# Patient Record
Sex: FEMALE | Race: BLACK OR AFRICAN AMERICAN | NOT HISPANIC OR LATINO | Employment: FULL TIME | URBAN - METROPOLITAN AREA
[De-identification: names, ages, dates, MRNs, and addresses within clinical notes are randomized per-mention and may not be internally consistent; named-entity substitution may affect disease eponyms.]

---

## 2023-03-27 LAB
ALANINE AMINOTRANSFERASE (SGPT) (U/L) IN SER/PLAS: 23 U/L (ref 7–45)
ALBUMIN (G/DL) IN SER/PLAS: 4 G/DL (ref 3.4–5)
ALKALINE PHOSPHATASE (U/L) IN SER/PLAS: 72 U/L (ref 33–136)
ANION GAP IN SER/PLAS: 12 MMOL/L (ref 10–20)
ASPARTATE AMINOTRANSFERASE (SGOT) (U/L) IN SER/PLAS: 19 U/L (ref 9–39)
BILIRUBIN TOTAL (MG/DL) IN SER/PLAS: 0.5 MG/DL (ref 0–1.2)
CALCIDIOL (25 OH VITAMIN D3) (NG/ML) IN SER/PLAS: 32 NG/ML
CALCIUM (MG/DL) IN SER/PLAS: 10.5 MG/DL (ref 8.6–10.6)
CALCIUM (MG/DL) IN URINE: 9.8 MG/DL
CALCIUM (MG/L) IN 24 HOUR URINE: 162 MG/24H (ref 100–300)
CARBON DIOXIDE, TOTAL (MMOL/L) IN SER/PLAS: 26 MMOL/L (ref 21–32)
CHLORIDE (MMOL/L) IN SER/PLAS: 106 MMOL/L (ref 98–107)
COLLECTION DURATION OF URINE: 24 HR
COLLECTION DURATION OF URINE: 24 HR
CREATININE (MG/24HR) IN 24 HOUR URINE: 0.9 G/24H (ref 0.67–1.59)
CREATININE (MG/24HR) IN 24 HOUR URINE: 0.9 G/24H (ref 0.67–1.59)
CREATININE (MG/DL) IN SER/PLAS: 0.67 MG/DL (ref 0.5–1.05)
CREATININE (MG/DL) IN URINE: 54.1 MG/DL (ref 20–320)
CREATININE (MG/DL) IN URINE: 54.1 MG/DL (ref 20–320)
GFR FEMALE: >90 ML/MIN/1.73M2
GLUCOSE (MG/DL) IN SER/PLAS: 103 MG/DL (ref 74–99)
PARATHYRIN INTACT (PG/ML) IN SER/PLAS: 102.9 PG/ML (ref 18.5–88)
PHOSPHATE (MG/DL) IN URINE: 21 MG/DL
PHOSPHORUS 24 HOUR URINE: 348 MG/24H (ref 400–1300)
POTASSIUM (MMOL/L) IN SER/PLAS: 4.1 MMOL/L (ref 3.5–5.3)
PROTEIN TOTAL: 7 G/DL (ref 6.4–8.2)
SODIUM (MMOL/L) IN SER/PLAS: 140 MMOL/L (ref 136–145)
THYROPEROXIDASE AB (IU/ML) IN SER/PLAS: 320 IU/ML
THYROTROPIN (MIU/L) IN SER/PLAS BY DETECTION LIMIT <= 0.05 MIU/L: 1.86 MIU/L (ref 0.44–3.98)
THYROXINE (T4) FREE (NG/DL) IN SER/PLAS: 1.13 NG/DL (ref 0.78–1.48)
UREA NITROGEN (MG/DL) IN SER/PLAS: 10 MG/DL (ref 6–23)
VOLUME OF URINE: 1657 ML
VOLUME OF URINE: 1657 ML

## 2023-03-30 LAB
THYROGLOBULIN AB (IU/ML) IN SER/PLAS: <0.9 IU/ML (ref 0–4)
THYROGLOBULIN LC-MS/MS: ABNORMAL NG/ML (ref 1.3–31.8)
THYROGLOBULIN: 172.4 NG/ML (ref 1.3–31.8)

## 2023-04-24 DIAGNOSIS — E78.5 HYPERLIPIDEMIA, UNSPECIFIED HYPERLIPIDEMIA TYPE: Primary | ICD-10-CM

## 2023-04-24 DIAGNOSIS — E55.9 VITAMIN D INSUFFICIENCY: ICD-10-CM

## 2023-04-24 RX ORDER — VIT C/E/ZN/COPPR/LUTEIN/ZEAXAN 250MG-90MG
25 CAPSULE ORAL DAILY
Qty: 30 CAPSULE | Refills: 11 | Status: SHIPPED | OUTPATIENT
Start: 2023-04-24 | End: 2023-04-27 | Stop reason: SDUPTHER

## 2023-04-24 RX ORDER — ATORVASTATIN CALCIUM 10 MG/1
10 TABLET, FILM COATED ORAL DAILY
Qty: 30 TABLET | Refills: 11 | Status: SHIPPED | OUTPATIENT
Start: 2023-04-24 | End: 2023-05-03 | Stop reason: SDUPTHER

## 2023-04-27 RX ORDER — VIT C/E/ZN/COPPR/LUTEIN/ZEAXAN 250MG-90MG
25 CAPSULE ORAL DAILY
Qty: 30 CAPSULE | Refills: 11 | Status: SHIPPED | OUTPATIENT
Start: 2023-04-27 | End: 2023-05-03 | Stop reason: SDUPTHER

## 2023-05-03 ENCOUNTER — LAB (OUTPATIENT)
Dept: LAB | Facility: LAB | Age: 63
End: 2023-05-03
Payer: COMMERCIAL

## 2023-05-03 ENCOUNTER — OFFICE VISIT (OUTPATIENT)
Dept: PRIMARY CARE | Facility: CLINIC | Age: 63
End: 2023-05-03
Payer: COMMERCIAL

## 2023-05-03 VITALS
TEMPERATURE: 97.8 F | HEIGHT: 68 IN | BODY MASS INDEX: 33.8 KG/M2 | SYSTOLIC BLOOD PRESSURE: 127 MMHG | DIASTOLIC BLOOD PRESSURE: 83 MMHG | WEIGHT: 223 LBS | OXYGEN SATURATION: 100 % | HEART RATE: 66 BPM

## 2023-05-03 DIAGNOSIS — E21.3 HYPERPARATHYROIDISM (MULTI): ICD-10-CM

## 2023-05-03 DIAGNOSIS — I10 PRIMARY HYPERTENSION: ICD-10-CM

## 2023-05-03 DIAGNOSIS — E78.5 HYPERLIPIDEMIA, UNSPECIFIED HYPERLIPIDEMIA TYPE: ICD-10-CM

## 2023-05-03 DIAGNOSIS — Z00.00 ROUTINE GENERAL MEDICAL EXAMINATION AT A HEALTH CARE FACILITY: Primary | ICD-10-CM

## 2023-05-03 DIAGNOSIS — Z12.31 OTHER SCREENING MAMMOGRAM: ICD-10-CM

## 2023-05-03 LAB
CHOLESTEROL (MG/DL) IN SER/PLAS: 184 MG/DL (ref 0–199)
CHOLESTEROL IN HDL (MG/DL) IN SER/PLAS: 79.3 MG/DL
CHOLESTEROL/HDL RATIO: 2.3
LDL: 92 MG/DL (ref 0–99)
TRIGLYCERIDE (MG/DL) IN SER/PLAS: 64 MG/DL (ref 0–149)
VLDL: 13 MG/DL (ref 0–40)

## 2023-05-03 PROCEDURE — 99213 OFFICE O/P EST LOW 20 MIN: CPT | Performed by: FAMILY MEDICINE

## 2023-05-03 PROCEDURE — 36415 COLL VENOUS BLD VENIPUNCTURE: CPT

## 2023-05-03 PROCEDURE — 3079F DIAST BP 80-89 MM HG: CPT | Performed by: FAMILY MEDICINE

## 2023-05-03 PROCEDURE — 99396 PREV VISIT EST AGE 40-64: CPT | Performed by: FAMILY MEDICINE

## 2023-05-03 PROCEDURE — 1036F TOBACCO NON-USER: CPT | Performed by: FAMILY MEDICINE

## 2023-05-03 PROCEDURE — 3074F SYST BP LT 130 MM HG: CPT | Performed by: FAMILY MEDICINE

## 2023-05-03 PROCEDURE — 80061 LIPID PANEL: CPT

## 2023-05-03 RX ORDER — ATORVASTATIN CALCIUM 10 MG/1
1 TABLET, FILM COATED ORAL NIGHTLY
COMMUNITY
Start: 2022-05-16 | End: 2023-05-03 | Stop reason: SDUPTHER

## 2023-05-03 RX ORDER — GLUCOSAMINE HCL 500 MG
1 TABLET ORAL DAILY
COMMUNITY
Start: 2023-04-26

## 2023-05-03 RX ORDER — HYDROCHLOROTHIAZIDE 12.5 MG/1
1 TABLET ORAL DAILY
COMMUNITY
Start: 2013-06-29 | End: 2023-05-03 | Stop reason: SDUPTHER

## 2023-05-03 RX ORDER — LATANOPROST 50 UG/ML
SOLUTION/ DROPS OPHTHALMIC
COMMUNITY
Start: 2021-04-08

## 2023-05-03 RX ORDER — HYDROCHLOROTHIAZIDE 12.5 MG/1
12.5 TABLET ORAL DAILY
Qty: 90 TABLET | Refills: 2 | Status: SHIPPED | OUTPATIENT
Start: 2023-05-03 | End: 2023-10-02 | Stop reason: SDUPTHER

## 2023-05-03 RX ORDER — ATORVASTATIN CALCIUM 10 MG/1
10 TABLET, FILM COATED ORAL DAILY
Qty: 90 TABLET | Refills: 2 | Status: SHIPPED | OUTPATIENT
Start: 2023-05-03 | End: 2024-05-01 | Stop reason: SDUPTHER

## 2023-05-03 RX ORDER — KETOTIFEN FUMARATE 0.35 MG/ML
SOLUTION/ DROPS OPHTHALMIC EVERY 12 HOURS
COMMUNITY
Start: 2021-12-14

## 2023-05-03 ASSESSMENT — PAIN SCALES - GENERAL: PAINLEVEL: 0-NO PAIN

## 2023-05-03 NOTE — PROGRESS NOTES
"Subjective   Patient ID: Louann Vazquez is a 63 y.o. who presents for Follow-up.  HPI    Louann Vazquez is a 63 y.o. year old here for an annual physical.    Concerns:     No concerns today. We reviewed the following:  -Hypertension- compliant with medication. No headaches, chest pain, shortness of breath, vision changes, dizziness  - Hyperparathyroidism- follows with endocrinology, stable  - Meningioma- follows with neurosurgery, stable    Diet: Reviewed diet. Working on decreasing junk food  Exercise: Walking frequently  Tobacco: Declines  Alcohol: Declines  Drugs: Declines  Depression: PHQ2-0    The 10-year ASCVD risk score (Afshin MALONEY, et al., 2019) is: 13.3%    Values used to calculate the score:      Age: 63 years      Sex: Female      Is Non- : Yes      Diabetic: Yes      Tobacco smoker: No      Systolic Blood Pressure: 127 mmHg      Is BP treated: No      HDL Cholesterol: 68.7 mg/dL      Total Cholesterol: 170 mg/dL    Immunizations due: Up to date    Breast Cancer  - last mammogram: May 2022    Cervical Cancer  - last pap: 2021    Colon Cancer  - last colonoscopy: 2021    Lipid: Tolerating atorvastatin. Due for lipid panel    Review of Systems  10 point review of systems negative except as noted in HPI.    Objective     /83 (BP Location: Left arm, Patient Position: Sitting)   Pulse 66   Temp 36.6 °C (97.8 °F)   Ht 1.727 m (5' 8\")   Wt 101 kg (223 lb)   SpO2 100%   BMI 33.91 kg/m²   General: well appearing, no distress  Neck: No lymphadenopathy, no thyromegaly  CV: Regular rate and rhythm, no murmur  Lungs: Clear to auscultation bilaterally  Abdomen: Soft, nontender, nondistended  Extremities: No edema noted  Psych: Appropriate mood and affect     Assessment/Plan   62 yo F here for follow-up and HM exam    HM  - Mammogram ordered  - check lipids  - UP to date on other screenings    HTN  - Continue hydrochlorothiazide    Hyperparathyroidism  - Continue management per " endocrine    Meningioma  - Continue management per neurosurgery     RTC 6 months or sooner as needed

## 2023-08-07 ENCOUNTER — APPOINTMENT (OUTPATIENT)
Dept: PRIMARY CARE | Facility: CLINIC | Age: 63
End: 2023-08-07
Payer: COMMERCIAL

## 2023-09-22 PROBLEM — H90.42 SENSORINEURAL HEARING LOSS (SNHL) OF LEFT EAR WITH UNRESTRICTED HEARING OF RIGHT EAR: Status: ACTIVE | Noted: 2023-09-22

## 2023-09-22 PROBLEM — E04.1 THYROID NODULE: Status: ACTIVE | Noted: 2023-09-22

## 2023-09-22 PROBLEM — E66.9 OBESITY: Status: ACTIVE | Noted: 2023-09-22

## 2023-09-22 PROBLEM — E83.52 HYPERCALCEMIA: Status: ACTIVE | Noted: 2023-09-22

## 2023-09-22 PROBLEM — R87.612 LOW GRADE SQUAMOUS INTRAEPITHELIAL LESION (LGSIL) ON PAPANICOLAOU SMEAR OF CERVIX: Status: ACTIVE | Noted: 2023-09-22

## 2023-09-22 PROBLEM — H61.21 IMPACTED CERUMEN OF RIGHT EAR: Status: ACTIVE | Noted: 2023-09-22

## 2023-09-22 PROBLEM — H90.3 ASYMMETRICAL SENSORINEURAL HEARING LOSS: Status: ACTIVE | Noted: 2023-09-22

## 2023-09-22 PROBLEM — K21.9 GERD (GASTROESOPHAGEAL REFLUX DISEASE): Status: ACTIVE | Noted: 2023-09-22

## 2023-09-22 PROBLEM — H10.9 CONJUNCTIVITIS: Status: ACTIVE | Noted: 2023-09-22

## 2023-09-22 PROBLEM — M70.61 GREATER TROCHANTERIC BURSITIS OF RIGHT HIP: Status: ACTIVE | Noted: 2023-09-22

## 2023-09-22 PROBLEM — I42.9 CARDIOMYOPATHY (MULTI): Status: ACTIVE | Noted: 2023-09-22

## 2023-09-22 PROBLEM — H69.90 EUSTACHIAN TUBE DYSFUNCTION: Status: ACTIVE | Noted: 2023-09-22

## 2023-09-22 PROBLEM — U07.1 COVID-19 VIRUS INFECTION: Status: ACTIVE | Noted: 2023-09-22

## 2023-09-22 PROBLEM — N30.01 ACUTE CYSTITIS WITH HEMATURIA: Status: ACTIVE | Noted: 2023-09-22

## 2023-09-22 PROBLEM — D72.819 LEUKOPENIA: Status: ACTIVE | Noted: 2023-09-22

## 2023-09-22 PROBLEM — M16.11 PRIMARY OSTEOARTHRITIS OF RIGHT HIP: Status: ACTIVE | Noted: 2023-09-22

## 2023-09-22 PROBLEM — D32.9 MENINGIOMA (MULTI): Status: ACTIVE | Noted: 2023-09-22

## 2023-09-22 PROBLEM — E11.9 DIABETES MELLITUS (MULTI): Status: ACTIVE | Noted: 2023-09-22

## 2023-09-22 PROBLEM — E55.9 VITAMIN D DEFICIENCY: Status: ACTIVE | Noted: 2023-09-22

## 2023-10-02 ENCOUNTER — OFFICE VISIT (OUTPATIENT)
Dept: PRIMARY CARE | Facility: CLINIC | Age: 63
End: 2023-10-02
Payer: COMMERCIAL

## 2023-10-02 VITALS
WEIGHT: 222.8 LBS | BODY MASS INDEX: 33.88 KG/M2 | HEART RATE: 70 BPM | SYSTOLIC BLOOD PRESSURE: 121 MMHG | TEMPERATURE: 97.5 F | DIASTOLIC BLOOD PRESSURE: 76 MMHG

## 2023-10-02 DIAGNOSIS — E78.5 HYPERLIPIDEMIA, UNSPECIFIED HYPERLIPIDEMIA TYPE: Primary | ICD-10-CM

## 2023-10-02 DIAGNOSIS — I10 PRIMARY HYPERTENSION: ICD-10-CM

## 2023-10-02 DIAGNOSIS — R05.3 CHRONIC COUGH: ICD-10-CM

## 2023-10-02 DIAGNOSIS — J30.9 ALLERGIC RHINITIS, UNSPECIFIED SEASONALITY, UNSPECIFIED TRIGGER: ICD-10-CM

## 2023-10-02 PROCEDURE — 3074F SYST BP LT 130 MM HG: CPT | Performed by: FAMILY MEDICINE

## 2023-10-02 PROCEDURE — 3078F DIAST BP <80 MM HG: CPT | Performed by: FAMILY MEDICINE

## 2023-10-02 PROCEDURE — 1036F TOBACCO NON-USER: CPT | Performed by: FAMILY MEDICINE

## 2023-10-02 PROCEDURE — 99214 OFFICE O/P EST MOD 30 MIN: CPT | Performed by: FAMILY MEDICINE

## 2023-10-02 RX ORDER — VIT C/E/ZN/COPPR/LUTEIN/ZEAXAN 250MG-90MG
25 CAPSULE ORAL DAILY
COMMUNITY
Start: 2023-08-18

## 2023-10-02 RX ORDER — HYDROCHLOROTHIAZIDE 12.5 MG/1
12.5 TABLET ORAL DAILY
Qty: 90 TABLET | Refills: 2 | Status: SHIPPED | OUTPATIENT
Start: 2023-10-02

## 2023-10-02 RX ORDER — MINERAL OIL
180 ENEMA (ML) RECTAL DAILY
Qty: 30 TABLET | Refills: 5 | Status: SHIPPED | OUTPATIENT
Start: 2023-10-02 | End: 2024-10-01

## 2023-10-02 RX ORDER — BENZONATATE 100 MG/1
100 CAPSULE ORAL 3 TIMES DAILY PRN
Qty: 42 CAPSULE | Refills: 0 | Status: SHIPPED | OUTPATIENT
Start: 2023-10-02 | End: 2023-11-01

## 2023-10-02 ASSESSMENT — ENCOUNTER SYMPTOMS
JOINT SWELLING: 0
DIFFICULTY URINATING: 0
APPETITE CHANGE: 0
CONSTIPATION: 0
FATIGUE: 0
NAUSEA: 0
WHEEZING: 0
RHINORRHEA: 1
UNEXPECTED WEIGHT CHANGE: 0
EYE ITCHING: 0
EYE REDNESS: 0
SLEEP DISTURBANCE: 0
COLOR CHANGE: 0
DIZZINESS: 0
DIARRHEA: 0
BACK PAIN: 0
SINUS PRESSURE: 1
DYSURIA: 0
FEVER: 0
ABDOMINAL PAIN: 0
COUGH: 1
CHEST TIGHTNESS: 0
SHORTNESS OF BREATH: 0
MYALGIAS: 0
EYE PAIN: 0
AGITATION: 0
FACIAL ASYMMETRY: 0
SINUS PAIN: 1
TROUBLE SWALLOWING: 0
VOMITING: 0
DIAPHORESIS: 0
ARTHRALGIAS: 1
NERVOUS/ANXIOUS: 0
SORE THROAT: 1
HEMATURIA: 0

## 2023-10-02 ASSESSMENT — PAIN SCALES - GENERAL: PAINLEVEL: 0-NO PAIN

## 2023-10-02 NOTE — PROGRESS NOTES
Subjective   Patient ID: Louann Vazquez is a 63 y.o. female who presents for Follow-up.    HPI     # Cough  - Last 2 and a half weeks  - Dry cough, coughs hard  - Occurs in fits for 5-10 minutes, then goes away -- 8-9 times day  - Sinus pressure, nose running, sneezing -- all summer -- has happened before in the summer  - Eyes itch and water - was rtaking Zyrtec and Nyquil and started not to help  - Throat sore from coughing  - Does not feel like has a fever  - Tested COVID negative  - Last week chinese cough syrup and didn't help    # HTN  - IO /76  - Taking hydrochlorothiazide  - Denies any side effects  - Denies chest pain, dizziness, vision changes, headaches, SOB    # Hyperparathyroidism  - follows with endocrine  - Last calcium 03/27/23 calcium 10.5, elevated PTH (but stable from prior)  - Will follow up in one year    # Dyslipidemia  - Taking statins with no issues    # Health Maintenance  - Did mammogram  - Opthomology in a month for glaucuoma    Social History:    Smoke: Remote 8 pk/year smoking history.  Alcohol: Occasional.  Other drugs: Denies.    Exercise: Does walking at work.      Review of Systems   Constitutional:  Negative for appetite change, diaphoresis, fatigue, fever and unexpected weight change.   HENT:  Positive for postnasal drip, rhinorrhea, sinus pressure, sinus pain, sneezing and sore throat. Negative for ear discharge, ear pain, hearing loss, nosebleeds, tinnitus and trouble swallowing.    Eyes:  Negative for pain, redness, itching and visual disturbance.   Respiratory:  Positive for cough. Negative for chest tightness, shortness of breath and wheezing.    Cardiovascular:  Negative for chest pain and leg swelling.   Gastrointestinal:  Negative for abdominal pain, constipation, diarrhea, nausea and vomiting.   Genitourinary:  Negative for difficulty urinating, dysuria and hematuria.   Musculoskeletal:  Positive for arthralgias. Negative for back pain, joint swelling and myalgias.    Skin:  Negative for color change, pallor and rash.   Neurological:  Negative for dizziness, syncope and facial asymmetry.   Psychiatric/Behavioral:  Negative for agitation and sleep disturbance. The patient is not nervous/anxious.          Objective   /76   Pulse 70   Temp 36.4 °C (97.5 °F) (Tympanic)   Wt 101 kg (222 lb 12.8 oz)   BMI 33.88 kg/m²     Physical Exam    General: NAD.  HEENT: Pharynx red. No tonsillar exudates. Anterior lymph nodes non-palpable. Sinus pain to palpation.  CV: RRR. Normal S1/S2. 1/6 holosystolic murmur. No rubs or gallops.  Pulm: Clear.  Abd: Non-tender to palpation. Non-distended.  MSK: Normal ROM. Joints non-tender to palpation.  Ksenia: Sensation and motor grossly in tact.  Psych: Appropriate mood and affect.    Assessment/Plan   64 yo here for follow-up    # Cough  - Ddx: viral, strep, allergic, bronchitis, pneumonia, GERD  - T = 97.5 F makes pneumonia less likely  - Centor criteria = -1 or 0 - strep unlikely  - Recommend hydration, rest, tylenol for presumed viral pharyngitis  - Call office if cough persists beyond another week to consider subacute causes  - Treat concomitant allergies - Allegra 180 mg, benzonatate 100 mg  - Patient does not want to do nasal sprays    # HTN  - Well-controlled  - IO /76  - C/w hydrochlorothiazide    # Hyperlipidemia  - Patient desires reduced dosage  - Fasting cholesterol to test feasibility of dose reduction  - C/w statin as prescribed for now    Follow up 6 months.    Danny Shaw, MS3    Patient was seen and examined by myself in conjunction with medical student. I have edited note above. Greg Benoit

## 2023-10-02 NOTE — PATIENT INSTRUCTIONS
Thanks for coming in today!    I will call you when I get the results of your cholesterol panel    If you are still coughing another 1.5 weeks from now please let me know    I'll see you back in 6 months

## 2023-10-12 DIAGNOSIS — J32.9 SINUSITIS, UNSPECIFIED CHRONICITY, UNSPECIFIED LOCATION: Primary | ICD-10-CM

## 2023-10-12 RX ORDER — AMOXICILLIN AND CLAVULANATE POTASSIUM 875; 125 MG/1; MG/1
875 TABLET, FILM COATED ORAL 2 TIMES DAILY
Qty: 20 TABLET | Refills: 0 | Status: SHIPPED | OUTPATIENT
Start: 2023-10-12 | End: 2023-10-22

## 2023-11-02 ENCOUNTER — OFFICE VISIT (OUTPATIENT)
Dept: OPHTHALMOLOGY | Facility: CLINIC | Age: 63
End: 2023-11-02
Payer: COMMERCIAL

## 2023-11-02 DIAGNOSIS — H40.1131 PRIMARY OPEN ANGLE GLAUCOMA (POAG) OF BOTH EYES, MILD STAGE: ICD-10-CM

## 2023-11-02 DIAGNOSIS — H40.1132 PRIMARY OPEN ANGLE GLAUCOMA (POAG) OF BOTH EYES, MODERATE STAGE: Primary | ICD-10-CM

## 2023-11-02 PROCEDURE — 99212 OFFICE O/P EST SF 10 MIN: CPT | Performed by: OPHTHALMOLOGY

## 2023-11-02 PROCEDURE — 92083 EXTENDED VISUAL FIELD XM: CPT | Performed by: OPHTHALMOLOGY

## 2023-11-02 PROCEDURE — 92133 CPTRZD OPH DX IMG PST SGM ON: CPT | Performed by: OPHTHALMOLOGY

## 2023-11-02 RX ORDER — TIMOLOL MALEATE 5 MG/ML
1 SOLUTION/ DROPS OPHTHALMIC DAILY
Qty: 1.5 ML | Refills: 11 | Status: SHIPPED | OUTPATIENT
Start: 2023-11-02 | End: 2024-11-01

## 2023-11-02 ASSESSMENT — VISUAL ACUITY
OD_CC+: -1
CORRECTION_TYPE: GLASSES
METHOD: SNELLEN - LINEAR
OD_CC: 20/25
OS_CC: 20/20

## 2023-11-02 ASSESSMENT — EXTERNAL EXAM - RIGHT EYE: OD_EXAM: NORMAL

## 2023-11-02 ASSESSMENT — REFRACTION_WEARINGRX
OD_AXIS: 032
OD_CYLINDER: -1.25
OS_AXIS: 028
OS_SPHERE: -0.25
OS_CYLINDER: -0.75
OD_ADD: +2.25
OS_ADD: +2.25
OD_SPHERE: -1.00

## 2023-11-02 ASSESSMENT — TONOMETRY
OS_IOP_MMHG: 19
OD_IOP_MMHG: 19
IOP_METHOD: GOLDMANN APPLANATION

## 2023-11-02 ASSESSMENT — GONIOSCOPY
OS_SUPERIOR: 3/360
OD_SUPERIOR: 3/360

## 2023-11-02 ASSESSMENT — EXTERNAL EXAM - LEFT EYE: OS_EXAM: NORMAL

## 2023-11-02 ASSESSMENT — SLIT LAMP EXAM - LIDS
COMMENTS: NORMAL
COMMENTS: NORMAL

## 2023-11-15 ENCOUNTER — TELEMEDICINE (OUTPATIENT)
Dept: PRIMARY CARE | Facility: CLINIC | Age: 63
End: 2023-11-15
Payer: COMMERCIAL

## 2023-11-15 DIAGNOSIS — R05.3 CHRONIC COUGH: Primary | ICD-10-CM

## 2023-11-15 PROCEDURE — 99213 OFFICE O/P EST LOW 20 MIN: CPT | Performed by: FAMILY MEDICINE

## 2023-11-15 NOTE — PROGRESS NOTES
Subjective   Patient ID: Louann Vazquez is a 63 y.o. female who presents via virtual visit for chronic cough    HPI     Cough  - Started in the middle of September  - Was seen in early October- started treatment for allergies at that time. Cough persisted and so we treated for sinusitis given significant nasal drainage and pressure  - Since then there has been no improvement in her cough  - Nonproductive, no hemoptysis  - Continues to have intermittent runny nose  - Stopped treatment for allergies last week because it did not seem to help  - Coughs throughout the day and night, no particular time when it is worse than others  - No association with food. No symptoms of GERD    ROS  - 10 point review of systems negative except as noted in HPI.      Objective   There were no vitals taken for this visit.    Physical Exam    Gen: Well appearing, no acute distress  HEENT: Mucous membranes moist  Pulm: Respirations nonlabored  Psych: Normal mood and affect      Assessment/Plan   64 yo here for follow-up    # Cough  - Treatment for allergies without improvement  - Treatment for sinusitis with improvement in sinus pressure but no improvement in cough  - Given chronicity will check CXR  - Consideration for referral to ENT vs pulmonology pending results

## 2024-04-03 ENCOUNTER — APPOINTMENT (OUTPATIENT)
Dept: PRIMARY CARE | Facility: CLINIC | Age: 64
End: 2024-04-03
Payer: COMMERCIAL

## 2024-04-10 ENCOUNTER — APPOINTMENT (OUTPATIENT)
Dept: PRIMARY CARE | Facility: CLINIC | Age: 64
End: 2024-04-10
Payer: COMMERCIAL

## 2024-04-12 ENCOUNTER — APPOINTMENT (OUTPATIENT)
Dept: PRIMARY CARE | Facility: CLINIC | Age: 64
End: 2024-04-12
Payer: COMMERCIAL

## 2024-05-01 DIAGNOSIS — E78.5 HYPERLIPIDEMIA, UNSPECIFIED HYPERLIPIDEMIA TYPE: ICD-10-CM

## 2024-05-01 RX ORDER — ATORVASTATIN CALCIUM 10 MG/1
10 TABLET, FILM COATED ORAL DAILY
Qty: 90 TABLET | Refills: 2 | Status: SHIPPED | OUTPATIENT
Start: 2024-05-01 | End: 2025-05-01

## 2024-06-07 ENCOUNTER — LAB (OUTPATIENT)
Dept: LAB | Facility: HOSPITAL | Age: 64
End: 2024-06-07
Payer: COMMERCIAL

## 2024-06-07 DIAGNOSIS — E78.5 HYPERLIPIDEMIA, UNSPECIFIED HYPERLIPIDEMIA TYPE: ICD-10-CM

## 2024-06-07 LAB
CHOLEST SERPL-MCNC: 179 MG/DL (ref 0–199)
CHOLESTEROL/HDL RATIO: 2.2
HDLC SERPL-MCNC: 79.8 MG/DL
LDLC SERPL CALC-MCNC: 89 MG/DL
NON HDL CHOLESTEROL: 99 MG/DL (ref 0–149)
TRIGL SERPL-MCNC: 49 MG/DL (ref 0–149)
VLDL: 10 MG/DL (ref 0–40)

## 2024-06-07 PROCEDURE — 36415 COLL VENOUS BLD VENIPUNCTURE: CPT

## 2024-06-07 PROCEDURE — 80061 LIPID PANEL: CPT

## 2024-06-12 ENCOUNTER — APPOINTMENT (OUTPATIENT)
Dept: PRIMARY CARE | Facility: CLINIC | Age: 64
End: 2024-06-12
Payer: COMMERCIAL

## 2024-06-14 ENCOUNTER — OFFICE VISIT (OUTPATIENT)
Dept: PRIMARY CARE | Facility: CLINIC | Age: 64
End: 2024-06-14
Payer: COMMERCIAL

## 2024-06-14 VITALS
BODY MASS INDEX: 33 KG/M2 | OXYGEN SATURATION: 99 % | DIASTOLIC BLOOD PRESSURE: 78 MMHG | SYSTOLIC BLOOD PRESSURE: 120 MMHG | HEART RATE: 96 BPM | HEIGHT: 69 IN | TEMPERATURE: 96.5 F | RESPIRATION RATE: 18 BRPM | WEIGHT: 222.8 LBS

## 2024-06-14 DIAGNOSIS — E78.5 HYPERLIPIDEMIA, UNSPECIFIED HYPERLIPIDEMIA TYPE: ICD-10-CM

## 2024-06-14 DIAGNOSIS — I10 PRIMARY HYPERTENSION: Primary | ICD-10-CM

## 2024-06-14 DIAGNOSIS — E21.3 HYPERPARATHYROIDISM (MULTI): ICD-10-CM

## 2024-06-14 LAB
25(OH)D3 SERPL-MCNC: 39 NG/ML (ref 30–100)
ALBUMIN SERPL BCP-MCNC: 4.2 G/DL (ref 3.4–5)
ALP SERPL-CCNC: 73 U/L (ref 33–136)
ALT SERPL W P-5'-P-CCNC: 19 U/L (ref 7–45)
ANION GAP SERPL CALC-SCNC: 16 MMOL/L (ref 10–20)
AST SERPL W P-5'-P-CCNC: 20 U/L (ref 9–39)
BILIRUB SERPL-MCNC: 0.4 MG/DL (ref 0–1.2)
BUN SERPL-MCNC: 13 MG/DL (ref 6–23)
CALCIUM SERPL-MCNC: 10.7 MG/DL (ref 8.6–10.6)
CHLORIDE SERPL-SCNC: 102 MMOL/L (ref 98–107)
CO2 SERPL-SCNC: 25 MMOL/L (ref 21–32)
CREAT SERPL-MCNC: 0.76 MG/DL (ref 0.5–1.05)
EGFRCR SERPLBLD CKD-EPI 2021: 87 ML/MIN/1.73M*2
GLUCOSE SERPL-MCNC: 101 MG/DL (ref 74–99)
POTASSIUM SERPL-SCNC: 3.5 MMOL/L (ref 3.5–5.3)
PROT SERPL-MCNC: 7.1 G/DL (ref 6.4–8.2)
PTH-INTACT SERPL-MCNC: 86.3 PG/ML (ref 18.5–88)
SODIUM SERPL-SCNC: 139 MMOL/L (ref 136–145)
TSH SERPL-ACNC: 0.8 MIU/L (ref 0.44–3.98)

## 2024-06-14 PROCEDURE — 1160F RVW MEDS BY RX/DR IN RCRD: CPT | Performed by: FAMILY MEDICINE

## 2024-06-14 PROCEDURE — 36415 COLL VENOUS BLD VENIPUNCTURE: CPT | Performed by: FAMILY MEDICINE

## 2024-06-14 PROCEDURE — 80053 COMPREHEN METABOLIC PANEL: CPT | Performed by: FAMILY MEDICINE

## 2024-06-14 PROCEDURE — 99214 OFFICE O/P EST MOD 30 MIN: CPT | Performed by: FAMILY MEDICINE

## 2024-06-14 PROCEDURE — 1126F AMNT PAIN NOTED NONE PRSNT: CPT | Performed by: FAMILY MEDICINE

## 2024-06-14 PROCEDURE — 1036F TOBACCO NON-USER: CPT | Performed by: FAMILY MEDICINE

## 2024-06-14 PROCEDURE — 3074F SYST BP LT 130 MM HG: CPT | Performed by: FAMILY MEDICINE

## 2024-06-14 PROCEDURE — 3048F LDL-C <100 MG/DL: CPT | Performed by: FAMILY MEDICINE

## 2024-06-14 PROCEDURE — 84443 ASSAY THYROID STIM HORMONE: CPT | Performed by: FAMILY MEDICINE

## 2024-06-14 PROCEDURE — 1159F MED LIST DOCD IN RCRD: CPT | Performed by: FAMILY MEDICINE

## 2024-06-14 PROCEDURE — 3078F DIAST BP <80 MM HG: CPT | Performed by: FAMILY MEDICINE

## 2024-06-14 PROCEDURE — 83970 ASSAY OF PARATHORMONE: CPT | Performed by: FAMILY MEDICINE

## 2024-06-14 PROCEDURE — 82306 VITAMIN D 25 HYDROXY: CPT | Performed by: FAMILY MEDICINE

## 2024-06-14 RX ORDER — HYDROCHLOROTHIAZIDE 12.5 MG/1
12.5 TABLET ORAL DAILY
Qty: 90 TABLET | Refills: 3 | Status: SHIPPED | OUTPATIENT
Start: 2024-06-14

## 2024-06-14 SDOH — ECONOMIC STABILITY: FOOD INSECURITY: WITHIN THE PAST 12 MONTHS, YOU WORRIED THAT YOUR FOOD WOULD RUN OUT BEFORE YOU GOT MONEY TO BUY MORE.: NEVER TRUE

## 2024-06-14 SDOH — ECONOMIC STABILITY: FOOD INSECURITY: WITHIN THE PAST 12 MONTHS, THE FOOD YOU BOUGHT JUST DIDN'T LAST AND YOU DIDN'T HAVE MONEY TO GET MORE.: NEVER TRUE

## 2024-06-14 ASSESSMENT — ENCOUNTER SYMPTOMS
LOSS OF SENSATION IN FEET: 0
DEPRESSION: 0
OCCASIONAL FEELINGS OF UNSTEADINESS: 0

## 2024-06-14 ASSESSMENT — PAIN SCALES - GENERAL: PAINLEVEL: 0-NO PAIN

## 2024-06-14 ASSESSMENT — LIFESTYLE VARIABLES: HOW MANY STANDARD DRINKS CONTAINING ALCOHOL DO YOU HAVE ON A TYPICAL DAY: PATIENT DOES NOT DRINK

## 2024-06-14 NOTE — PROGRESS NOTES
"Subjective   Patient ID: Louann Vazquez is a 64 y.o. female who presents for Follow-up.    HPI     # HTN  - Taking hydrochlorothiazide  - Denies any side effects  - Denies chest pain, dizziness, vision changes, headaches, SOB    # Hyperparathyroidism  - follows with endocrine  - Last calcium 03/27/23 calcium 10.5, elevated PTH (but stable from prior)  - Overdue for follow-up    # Dyslipidemia  - Taking statins with no issue  - Lipids look great, however ASCVD risk remains elevated    Due for welcome to medicare visit  Mammogram up to date  Colonoscopy and Pap due in 2026      Objective   /78   Pulse 96   Temp 35.8 °C (96.5 °F)   Resp 18   Ht 1.753 m (5' 9\")   Wt 101 kg (222 lb 12.8 oz)   SpO2 99%   BMI 32.90 kg/m²     Physical Exam    General: well appearing, no distress  Neck: No lymphadenopathy, no thyromegaly  CV: Regular rate and rhythm, no murmur  Lungs: Clear to auscultation bilaterally  Abdomen: Soft, nontender, nondistended  Extremities: No edema noted  Psych: Appropriate mood and affect      Assessment/Plan   63 yo here for follow-up    # HTN  - Well-controlled  - C/w hydrochlorothiazide    # Hyperlipidemia  - Lipids much improved. Continue current regimen    #Hyperparathyroidism  - Obtain labs, follow-up with endocrinology    RTC for medicare wellness visit  "

## 2024-07-11 ENCOUNTER — APPOINTMENT (OUTPATIENT)
Dept: OPHTHALMOLOGY | Facility: CLINIC | Age: 64
End: 2024-07-11
Payer: COMMERCIAL

## 2024-07-11 DIAGNOSIS — H40.10X1 OPEN-ANGLE GLAUCOMA OF BOTH EYES, MILD STAGE, UNSPECIFIED OPEN-ANGLE GLAUCOMA TYPE: Primary | ICD-10-CM

## 2024-07-11 PROCEDURE — 99214 OFFICE O/P EST MOD 30 MIN: CPT | Performed by: OPHTHALMOLOGY

## 2024-07-11 ASSESSMENT — REFRACTION_WEARINGRX
OS_SPHERE: -0.25
OS_CYLINDER: -0.75
SPECS_TYPE: PAL
OS_ADD: +2.25
OD_AXIS: 032
OD_ADD: +2.25
OS_AXIS: 028
OD_SPHERE: -1.00
OD_CYLINDER: -1.25

## 2024-07-11 ASSESSMENT — REFRACTION_MANIFEST
OS_AXIS: 050
OD_AXIS: 065
OD_CYLINDER: -0.50
OD_ADD: +2.50
OD_SPHERE: -1.25
OS_ADD: +2.50
OS_CYLINDER: -0.75
OS_SPHERE: -0.75

## 2024-07-11 ASSESSMENT — VISUAL ACUITY
OS_CC+: -2
CORRECTION_TYPE: GLASSES
METHOD: SNELLEN - LINEAR
OD_CC+: +2
OS_CC: 20/20
OD_CC: 20/25

## 2024-07-11 ASSESSMENT — PACHYMETRY
OS_CT(UM): 612
OD_CT(UM): 602

## 2024-07-11 ASSESSMENT — CUP TO DISC RATIO
OS_RATIO: 0.55
OD_RATIO: 0.5

## 2024-07-11 ASSESSMENT — ENCOUNTER SYMPTOMS
RESPIRATORY NEGATIVE: 0
ALLERGIC/IMMUNOLOGIC NEGATIVE: 0
GASTROINTESTINAL NEGATIVE: 0
CONSTITUTIONAL NEGATIVE: 0
EYES NEGATIVE: 1
CARDIOVASCULAR NEGATIVE: 0
ENDOCRINE NEGATIVE: 0
MUSCULOSKELETAL NEGATIVE: 0
NEUROLOGICAL NEGATIVE: 0
PSYCHIATRIC NEGATIVE: 0
HEMATOLOGIC/LYMPHATIC NEGATIVE: 0

## 2024-07-11 ASSESSMENT — SLIT LAMP EXAM - LIDS
COMMENTS: NORMAL
COMMENTS: NORMAL

## 2024-07-11 ASSESSMENT — TONOMETRY
OD_IOP_MMHG: 18
OS_IOP_MMHG: 19
IOP_METHOD: GOLDMANN APPLANATION

## 2024-07-11 ASSESSMENT — EXTERNAL EXAM - LEFT EYE: OS_EXAM: NORMAL

## 2024-07-11 ASSESSMENT — EXTERNAL EXAM - RIGHT EYE: OD_EXAM: NORMAL

## 2024-11-03 DIAGNOSIS — H40.1131 PRIMARY OPEN ANGLE GLAUCOMA (POAG) OF BOTH EYES, MILD STAGE: ICD-10-CM

## 2024-11-05 RX ORDER — TIMOLOL MALEATE 5 MG/ML
1 SOLUTION/ DROPS OPHTHALMIC DAILY
Qty: 15 ML | Refills: 3 | Status: SHIPPED | OUTPATIENT
Start: 2024-11-05 | End: 2025-11-05

## 2024-11-06 ENCOUNTER — LAB (OUTPATIENT)
Dept: LAB | Facility: LAB | Age: 64
End: 2024-11-06
Payer: COMMERCIAL

## 2024-11-06 ENCOUNTER — APPOINTMENT (OUTPATIENT)
Dept: ENDOCRINOLOGY | Facility: CLINIC | Age: 64
End: 2024-11-06
Payer: COMMERCIAL

## 2024-11-06 VITALS
BODY MASS INDEX: 32.58 KG/M2 | HEIGHT: 69 IN | HEART RATE: 64 BPM | SYSTOLIC BLOOD PRESSURE: 122 MMHG | DIASTOLIC BLOOD PRESSURE: 73 MMHG | WEIGHT: 220 LBS

## 2024-11-06 DIAGNOSIS — E21.3 HYPERPARATHYROIDISM (MULTI): ICD-10-CM

## 2024-11-06 DIAGNOSIS — E21.3 HYPERPARATHYROIDISM (MULTI): Primary | ICD-10-CM

## 2024-11-06 LAB
25(OH)D3 SERPL-MCNC: 41 NG/ML (ref 30–100)
ALBUMIN SERPL BCP-MCNC: 4.3 G/DL (ref 3.4–5)
ANION GAP SERPL CALC-SCNC: 7 MMOL/L (ref 10–20)
BUN SERPL-MCNC: 11 MG/DL (ref 6–23)
CALCIUM SERPL-MCNC: 10.8 MG/DL (ref 8.6–10.6)
CHLORIDE SERPL-SCNC: 106 MMOL/L (ref 98–107)
CO2 SERPL-SCNC: 33 MMOL/L (ref 21–32)
CREAT SERPL-MCNC: 0.66 MG/DL (ref 0.5–1.05)
EGFRCR SERPLBLD CKD-EPI 2021: >90 ML/MIN/1.73M*2
GLUCOSE SERPL-MCNC: 93 MG/DL (ref 74–99)
PHOSPHATE SERPL-MCNC: 3.1 MG/DL (ref 2.5–4.9)
POTASSIUM SERPL-SCNC: 4.2 MMOL/L (ref 3.5–5.3)
PTH-INTACT SERPL-MCNC: 88.8 PG/ML (ref 18.5–88)
SODIUM SERPL-SCNC: 142 MMOL/L (ref 136–145)

## 2024-11-06 PROCEDURE — 3078F DIAST BP <80 MM HG: CPT | Performed by: INTERNAL MEDICINE

## 2024-11-06 PROCEDURE — 83970 ASSAY OF PARATHORMONE: CPT

## 2024-11-06 PROCEDURE — 82306 VITAMIN D 25 HYDROXY: CPT

## 2024-11-06 PROCEDURE — 3074F SYST BP LT 130 MM HG: CPT | Performed by: INTERNAL MEDICINE

## 2024-11-06 PROCEDURE — 1159F MED LIST DOCD IN RCRD: CPT | Performed by: INTERNAL MEDICINE

## 2024-11-06 PROCEDURE — 80069 RENAL FUNCTION PANEL: CPT

## 2024-11-06 PROCEDURE — 3048F LDL-C <100 MG/DL: CPT | Performed by: INTERNAL MEDICINE

## 2024-11-06 PROCEDURE — 36415 COLL VENOUS BLD VENIPUNCTURE: CPT

## 2024-11-06 PROCEDURE — 99214 OFFICE O/P EST MOD 30 MIN: CPT | Performed by: INTERNAL MEDICINE

## 2024-11-06 PROCEDURE — 1126F AMNT PAIN NOTED NONE PRSNT: CPT | Performed by: INTERNAL MEDICINE

## 2024-11-06 PROCEDURE — 3008F BODY MASS INDEX DOCD: CPT | Performed by: INTERNAL MEDICINE

## 2024-11-06 ASSESSMENT — PAIN SCALES - GENERAL: PAINLEVEL_OUTOF10: 0-NO PAIN

## 2024-11-06 NOTE — PROGRESS NOTES
Chief Complaint: follow up    HPI:    The patient is a 64 year old female with a past medical history significant of but not limited to HTN , Meningioma , DLD, primary and secondary hyperparathyroidism due to Vit D def, thyroid nodule who presents to the office today for a follow up visit.    Background Hx:    The patient had established care with endocrinology  in July 2020 through virtual visit. At that time, the concern was lab finding of hyperparathyroidism (.8) with CoCa level 10.9 , low vitamin D 12 , without any positive FH of Ca / parathyroid disorders - pt. was asymptomatic, no evidence of osteoporosis. No H/O kidney stones. At that appt, her HCTZ dose was suggested to decrease from 25 mg to 12.5 mg daily, she was suggested to increase her vitamin D intake from 1000 to 3000 units daily (though she kept taking 1000 units daily) , plan was to repeat labs in 3 months , pt. lost follow up after that .     Last endocrine visit 4/8/23: For her hyperparathyroidism, 24 hr Urine studies were obtained which did not show hypercalciuria ( in the setting of HCTZ use). DXA scan completed 4/2023 shows normal BMD. Calcium and PTH levels stable. She does not currently meet criteria for surgical evaluation.     Interval Hx:  The patient states she is doing well and denies any concerns today.    No bouts of confusion  No fatigue, stable energy  No sleep disturbance   Stable appetite and weight.  Denies any chest pain, shortness of breath, palpitations.  No changes in bowel habits, constipation, diarrhea  Reports polyuria or polydipsia and nocturia ( twice)  No reports of tremors, muscle weakness, cramps, tingling.  Reports often hot flashes or night sweats.  No heat/cold intol  No reports of nausea/vomit or abdominal pain  No Breast tenderness/masses  No Kidney stones  No H/o Fracture(s) or falls  Occasionally GERD, takes TUMS ( has been months since she took one)  Drugs: Anti-acid/TUMS, Li, Thiazide: +  uses TUMS,  hydrochlorothiazide    She also takes 2000 international units of Vit D daily.    ROS:  Negative unless noted above    Patient Active Problem List   Diagnosis    Hyperlipidemia    Hypertension    Hyperparathyroidism (Multi)    Acute cystitis with hematuria    Asymmetrical sensorineural hearing loss    Cardiomyopathy    Conjunctivitis    COVID-19 virus infection    Diabetes mellitus (Multi)    Eustachian tube dysfunction    GERD (gastroesophageal reflux disease)    Greater trochanteric bursitis of right hip    Hypercalcemia    Impacted cerumen of right ear    Leukopenia    Low grade squamous intraepithelial lesion (LGSIL) on Papanicolaou smear of cervix    Meningioma (Multi)    Primary osteoarthritis of right hip    Sensorineural hearing loss (SNHL) of left ear with unrestricted hearing of right ear    Thyroid nodule    Vitamin D deficiency    Obesity     Family History   Problem Relation Name Age of Onset    Hypertension Mother      Tuberculosis Mother      Coronary artery disease Father  55    Colon cancer Brother      Hypertension Sibling      Glaucoma Neg Hx      Macular degeneration Neg Hx       Past Surgical History:   Procedure Laterality Date    CATARACT EXTRACTION Bilateral 06/30/2015    Cataract Surgery    CHOLECYSTECTOMY  06/30/2015    Cholecystectomy     No Known Allergies  Social History     Socioeconomic History    Marital status: Single     Spouse name: Not on file    Number of children: Not on file    Years of education: Not on file    Highest education level: Not on file   Occupational History    Not on file   Tobacco Use    Smoking status: Former     Types: Cigarettes    Smokeless tobacco: Never   Substance and Sexual Activity    Alcohol use: Yes    Drug use: Never    Sexual activity: Not on file   Other Topics Concern    Not on file   Social History Narrative    Not on file     Social Drivers of Health     Financial Resource Strain: Not on file   Food Insecurity: No Food Insecurity (6/14/2024)     "Hunger Vital Sign     Worried About Running Out of Food in the Last Year: Never true     Ran Out of Food in the Last Year: Never true   Transportation Needs: Not on file   Physical Activity: Not on file   Stress: Not on file   Social Connections: Not on file   Intimate Partner Violence: Not on file   Housing Stability: Not on file     Vitals:    11/06/24 0750   BP: 122/73   BP Location: Right arm   Patient Position: Sitting   BP Cuff Size: Large adult   Pulse: 64   Weight: 99.8 kg (220 lb)   Height: 1.753 m (5' 9\")     Physical Exam:    General: patient in NAD  HEENT: AT/NC  Neck: trachea in midline, rubbery thyroid about 30 g, B/L nodules largest about 2 cm  Resp: CTA B/L  CVS: normal s1 and s2  Abdomen: soft and non tender to palpation, BS+  Skin: warm, dry and intact, multiple visible skin tags  Neuro: AAO x3, DTR 2+, chvostek's -  Psych: cooperative    Medications:    Current Outpatient Medications on File Prior to Visit   Medication Sig Dispense Refill    atorvastatin (Lipitor) 10 mg tablet Take 1 tablet (10 mg) by mouth once daily. 90 tablet 2    cholecalciferol (Vitamin D-3) 25 MCG (1000 UT) capsule Take 1 capsule (25 mcg) by mouth once daily.      hydroCHLOROthiazide (Microzide) 12.5 mg tablet Take 1 tablet (12.5 mg) by mouth once daily. (Patient taking differently: Take 2 tablets (25 mg) by mouth once daily.) 90 tablet 3    timolol (Timoptic) 0.5 % ophthalmic solution ADMINISTER 1 DROP INTO BOTH EYES ONCE DAILY. 15 mL 3    cholecalciferol, vitamin D3, 75 mcg (3,000 unit) tablet Take 1 tablet (75 mcg) by mouth once daily. (Patient not taking: Reported on 11/6/2024)      fexofenadine (Allegra) 180 mg tablet Take 1 tablet (180 mg) by mouth once daily. 30 tablet 5    ketotifen (Zaditor) 0.025 % (0.035 %) ophthalmic solution Administer into affected eye(s) every 12 hours. (Patient not taking: Reported on 11/6/2024)      [DISCONTINUED] timolol (Timoptic) 0.5 % ophthalmic solution Administer 1 drop into both eyes " once daily. 1.5 mL 11     No current facility-administered medications on file prior to visit.     Labs:   Latest Reference Range & Units 03/02/20 09:58 03/05/21 09:38 05/19/22 16:05 03/27/23 08:18 06/14/24 15:11   Parathyroid Hormone, Intact 18.5 - 88.0 pg/mL 134.8 (H) 124.5 (H) 144.5 (H) 102.9 (H) 86.3   Thyroid Stimulating Hormone 0.44 - 3.98 mIU/L 1.66   1.86 0.80   Vitamin D, 25-Hydroxy, Total 30 - 100 ng/mL 12 ! 24 !  32 39   (H): Data is abnormally high  !: Data is abnormal     Latest Reference Range & Units 05/22/18 09:44 04/24/19 14:30 01/03/20 12:11 03/02/20 09:58 06/16/20 13:30 03/05/21 09:38 02/21/22 16:45 05/19/22 16:05 02/15/23 16:54 03/27/23 08:18 06/14/24 15:11   GLUCOSE 74 - 99 mg/dL 93 87 72 (L) 100 (H)  89  90  103 (H) 101 (H)   SODIUM 136 - 145 mmol/L 142 143 140 145  142  140  140 139   POTASSIUM 3.5 - 5.3 mmol/L 4.4 3.8 4.3 4.4  4.2  4.0  4.1 3.5   CHLORIDE 98 - 107 mmol/L 105 104 103 108 (H)  106  105  106 102   Bicarbonate 21 - 32 mmol/L 31 28 30 27  29  26  26 25   Anion Gap 10 - 20 mmol/L 10 15 11 14  11  13  12 16   Blood Urea Nitrogen 6 - 23 mg/dL 12 12 10 13 13 16 8 12 8 10 13   Creatinine 0.50 - 1.05 mg/dL 0.59 0.61 0.67 0.72 0.70 0.68 0.61 0.70 0.60 0.67 0.76   EGFR >60 mL/min/1.73m*2           87   Calcium 8.6 - 10.6 mg/dL 10.5 10.8 (H) 11.0 (H) 11.0 (H)  10.6  10.4  10.5 10.7 (H)   PHOSPHORUS 2.5 - 4.9 mg/dL      2.8        (L): Data is abnormally low  (H): Data is abnormally high    Imaging:  Interpreted By:  DOREEN THORNTON MD   Name: BEKAH CABRERA   Patient ID: [id number] YOB: 1960 Height: 68.0 in.   Gender: Female Exam Date: 04/13/2023 Weight: 210.0 lbs.   Indications: Hyperparathyroid, Post Menopausal, Screening   Fractures:   Treatments:      LEFT FEMUR - TOTAL   The bone mineral density : 0.964 g/cm2   T-score : -0.3 % of young normal mean :96 %   Z-score : -0.2 % of age matched mean : 98 %   % change vs. Previous : - % change vs. Baseline : baseline    *Indicates significant change based on 95% confidence interval.      LEFT FEMUR - NECK   The bone mineral density : 1.027 g/cm2   T-score : -0.1 % of young normal mean: 99 %   Z-score : 0.4 % of age matched mean : 106 %   % change vs. Previous : - % change vs. Baseline : baseline   *Indicates significant change based on 95% confidence interval.      SPINE L1-L4   The bone mineral density is 1.394 g/cm2   T-score : 1.8 % of young normal mean is 118 %   Z-score : 2.6 % of age matched mean is 129 %   % change vs. Previous : - % change vs. Baseline : baseline   *Indicates significant change based on 95% confidence interval.      LEFT FOREARM Radius 33%   The bone mineral density : 0.864 g/cm2   T-score : -0.3 % of young normal mean :97 %   Z-score : 0.3 % of age matched mean : 103 %   % change vs. Previous : - % change vs. Baseline : baseline   *Indicates significant change based on 95% confidence interval.      World Health Organization (WHO) criteria for post-menopausal,    Women:   Normal: T-score at or above -1 SD   Osteopenia: T-score between -1 and -2.5 SD   Osteoporosis: T-score at or below -2.5 SD      10-Year Fracture Risk:   Major Osteoporotic Fracture -   Hip Fracture -   Reported Risk Factors None      Interpretation:   According to World Health Organization criteria, classification is   normal.      Follow-up DEXA and treatment is recommended as clinically indicated.      All images and detailed analysis are available on the  Radiology   PACS.     Assessment and Plan:  The patient is a 64 year old female who presents to the office today to follow up for primary and secondary hyperparathyroidism due to vit D def and thyroid nodule. The patient is consistent with hydrochlorothiazide, which is important for her as it promotes hypercalcemia thereby decreasing hypercalciuria, which further decreases the risk of nephrolithiasis in patients with hyperparathyroidism. Educated the patient to continue  taking the hydrochlorothiazide along with her Vit D, the levels of which have started to  normalize thereby helping the PTH.    - Will recheck PTH, RFP and Vit D. Will call the patient incase there are any concerns.  - RTC in 1 year    For Thyroid Nodule:  - The nodules were both TIRADS 3 thus not meeting the FNA criteria, unchanged in size since, patient is not symptomatic.  - Will continue to monitor.    The patient was seen and discussed with Dr. Ruiz.    Luis Daniel Ferrera MD  PGY-4 Endocrinology Fellow

## 2024-11-11 DIAGNOSIS — E21.3 HYPERPARATHYROIDISM (MULTI): Primary | ICD-10-CM

## 2024-11-11 DIAGNOSIS — E55.9 VITAMIN D DEFICIENCY: ICD-10-CM

## 2024-11-11 NOTE — PROGRESS NOTES
Latest Reference Range & Units 03/02/20 09:58 03/05/21 09:38 05/19/22 16:05 03/27/23 08:18 06/14/24 15:11 11/06/24 08:35   Parathyroid Hormone, Intact 18.5 - 88.0 pg/mL 134.8 (H) 124.5 (H) 144.5 (H) 102.9 (H) 86.3 88.8 (H)   Vitamin D, 25-Hydroxy, Total 30 - 100 ng/mL 12 ! 24 !  32 39 41   (H): Data is abnormally high  !: Data is abnormal     Latest Reference Range & Units 11/06/24 08:35   GLUCOSE 74 - 99 mg/dL 93   SODIUM 136 - 145 mmol/L 142   POTASSIUM 3.5 - 5.3 mmol/L 4.2   CHLORIDE 98 - 107 mmol/L 106   Bicarbonate 21 - 32 mmol/L 33 (H)   Anion Gap 10 - 20 mmol/L 7 (L)   Blood Urea Nitrogen 6 - 23 mg/dL 11   Creatinine 0.50 - 1.05 mg/dL 0.66   EGFR >60 mL/min/1.73m*2 >90   Calcium 8.6 - 10.6 mg/dL 10.8 (H)   PHOSPHORUS 2.5 - 4.9 mg/dL 3.1   Albumin 3.4 - 5.0 g/dL 4.3   (H): Data is abnormally high  (L): Data is abnormally low    Reviewed the patient's labs and she does have primary hyperparathyroidisms due to the high normal levels of calcium in the past which is essentially diagnostic for it with a component of Vit D deficiency. Would advice to increase Vit D to 5000 units every day and repeat PTH, RFP and Vit D in 3 months. Patient aware.

## 2024-11-22 ENCOUNTER — APPOINTMENT (OUTPATIENT)
Dept: PRIMARY CARE | Facility: CLINIC | Age: 64
End: 2024-11-22
Payer: COMMERCIAL

## 2024-12-20 ENCOUNTER — OFFICE VISIT (OUTPATIENT)
Dept: PRIMARY CARE | Facility: CLINIC | Age: 64
End: 2024-12-20
Payer: COMMERCIAL

## 2024-12-20 VITALS
BODY MASS INDEX: 33.03 KG/M2 | DIASTOLIC BLOOD PRESSURE: 73 MMHG | TEMPERATURE: 95 F | HEART RATE: 56 BPM | RESPIRATION RATE: 16 BRPM | SYSTOLIC BLOOD PRESSURE: 159 MMHG | HEIGHT: 69 IN | WEIGHT: 223 LBS | OXYGEN SATURATION: 100 %

## 2024-12-20 DIAGNOSIS — Z00.00 ROUTINE GENERAL MEDICAL EXAMINATION AT A HEALTH CARE FACILITY: ICD-10-CM

## 2024-12-20 DIAGNOSIS — Z00.00 MEDICARE ANNUAL WELLNESS VISIT, INITIAL: Primary | ICD-10-CM

## 2024-12-20 DIAGNOSIS — I10 PRIMARY HYPERTENSION: ICD-10-CM

## 2024-12-20 DIAGNOSIS — Z12.31 OTHER SCREENING MAMMOGRAM: ICD-10-CM

## 2024-12-20 PROCEDURE — 3077F SYST BP >= 140 MM HG: CPT | Performed by: FAMILY MEDICINE

## 2024-12-20 PROCEDURE — 99215 OFFICE O/P EST HI 40 MIN: CPT | Performed by: FAMILY MEDICINE

## 2024-12-20 PROCEDURE — 1126F AMNT PAIN NOTED NONE PRSNT: CPT | Performed by: FAMILY MEDICINE

## 2024-12-20 PROCEDURE — 1160F RVW MEDS BY RX/DR IN RCRD: CPT | Performed by: FAMILY MEDICINE

## 2024-12-20 PROCEDURE — 3008F BODY MASS INDEX DOCD: CPT | Performed by: FAMILY MEDICINE

## 2024-12-20 PROCEDURE — 1036F TOBACCO NON-USER: CPT | Performed by: FAMILY MEDICINE

## 2024-12-20 PROCEDURE — G0438 PPPS, INITIAL VISIT: HCPCS | Performed by: FAMILY MEDICINE

## 2024-12-20 PROCEDURE — 3078F DIAST BP <80 MM HG: CPT | Performed by: FAMILY MEDICINE

## 2024-12-20 PROCEDURE — 1159F MED LIST DOCD IN RCRD: CPT | Performed by: FAMILY MEDICINE

## 2024-12-20 PROCEDURE — 1170F FXNL STATUS ASSESSED: CPT | Performed by: FAMILY MEDICINE

## 2024-12-20 ASSESSMENT — ACTIVITIES OF DAILY LIVING (ADL)
MANAGING_FINANCES: INDEPENDENT
DRESSING: INDEPENDENT
BATHING: INDEPENDENT
DOING_HOUSEWORK: INDEPENDENT
GROCERY_SHOPPING: INDEPENDENT
TAKING_MEDICATION: INDEPENDENT

## 2024-12-20 ASSESSMENT — ENCOUNTER SYMPTOMS
OCCASIONAL FEELINGS OF UNSTEADINESS: 0
DEPRESSION: 0
LOSS OF SENSATION IN FEET: 0

## 2024-12-20 ASSESSMENT — PATIENT HEALTH QUESTIONNAIRE - PHQ9
2. FEELING DOWN, DEPRESSED OR HOPELESS: NOT AT ALL
SUM OF ALL RESPONSES TO PHQ9 QUESTIONS 1 AND 2: 0
1. LITTLE INTEREST OR PLEASURE IN DOING THINGS: NOT AT ALL

## 2024-12-20 ASSESSMENT — VISUAL ACUITY
OS_CC: 20/25
OD_CC: 20/25

## 2024-12-20 ASSESSMENT — PAIN SCALES - GENERAL: PAINLEVEL_OUTOF10: 0-NO PAIN

## 2024-12-20 NOTE — PROGRESS NOTES
"Subjective   Patient ID: Louann Vazquez is a 64 y.o. female who presents for Medicare Annual Wellness Visit Subsequent.    HPI     Medicare wellness visit  - Items reviewed in [facility]  - Reviewed medical, surgery and family history  - Reviewed current providers  - Reviewed depression screen  - Reviewed functional ability screenings  - No concerns for cognitive impairment   - No current advanced care planning, not interested in further information at this time   - Reviewed preventive health screenings. Any needed updates will be outlined in assessment and plan     # HTN  - Taking hydrochlorothiazide  - Denies any side effects  - Denies chest pain, dizziness, vision changes, headaches, SOB    # Hyperparathyroidism  - follows with endocrine  - Due for repeat labs in February     # Dyslipidemia  - Taking statins with no issue      Objective   /73   Pulse 56   Temp 35 °C (95 °F)   Resp 16   Ht 1.753 m (5' 9\")   Wt 101 kg (223 lb)   SpO2 100%   BMI 32.93 kg/m²   Both eyes: 20/16, R eye 20/25, L eye 20/25  Physical Exam    General: well appearing, no distress  Neck: No lymphadenopathy, no thyromegaly  CV: Regular rate and rhythm, no murmur  Lungs: Clear to auscultation bilaterally  Abdomen: Soft, nontender, nondistended  Extremities: No edema noted  Psych: Appropriate mood and affect      Assessment/Plan   65 yo here for Medicare Wellness visit     #Medicare Wellness visit  - Reviewed questionaires, no concerns  - Reviewed screening recommendations: mammogram due now, pap and colonoscopy in 2026  - Declines covid or pneumonia vaccinations    # HTN  - Elevated today, however normal at recent visits  - C/w hydrochlorothiazide, check bp at work and report back if elevated     # Hyperlipidemia  - Continue current regimen    #Hyperparathyroidism  - Management per endocrinology    RTC 6 months or sooner as needed     "

## 2025-01-24 ENCOUNTER — APPOINTMENT (OUTPATIENT)
Dept: RADIOLOGY | Facility: HOSPITAL | Age: 65
End: 2025-01-24
Payer: COMMERCIAL

## 2025-01-31 ENCOUNTER — HOSPITAL ENCOUNTER (OUTPATIENT)
Dept: RADIOLOGY | Facility: HOSPITAL | Age: 65
Discharge: HOME | End: 2025-01-31
Payer: COMMERCIAL

## 2025-01-31 VITALS — WEIGHT: 223 LBS | HEIGHT: 69 IN | BODY MASS INDEX: 33.03 KG/M2

## 2025-01-31 DIAGNOSIS — Z12.31 OTHER SCREENING MAMMOGRAM: ICD-10-CM

## 2025-01-31 PROCEDURE — 77063 BREAST TOMOSYNTHESIS BI: CPT | Performed by: RADIOLOGY

## 2025-01-31 PROCEDURE — 77067 SCR MAMMO BI INCL CAD: CPT | Performed by: RADIOLOGY

## 2025-01-31 PROCEDURE — 77067 SCR MAMMO BI INCL CAD: CPT

## 2025-02-05 DIAGNOSIS — E78.5 HYPERLIPIDEMIA, UNSPECIFIED HYPERLIPIDEMIA TYPE: ICD-10-CM

## 2025-02-05 RX ORDER — ATORVASTATIN CALCIUM 10 MG/1
10 TABLET, FILM COATED ORAL DAILY
Qty: 90 TABLET | Refills: 2 | Status: SHIPPED | OUTPATIENT
Start: 2025-02-05 | End: 2026-02-05

## 2025-02-14 DIAGNOSIS — E78.5 HYPERLIPIDEMIA, UNSPECIFIED HYPERLIPIDEMIA TYPE: ICD-10-CM

## 2025-02-14 RX ORDER — ATORVASTATIN CALCIUM 10 MG/1
10 TABLET, FILM COATED ORAL DAILY
Qty: 90 TABLET | Refills: 2 | Status: SHIPPED | OUTPATIENT
Start: 2025-02-14 | End: 2026-02-14

## 2025-02-15 ENCOUNTER — CLINICAL SUPPORT (OUTPATIENT)
Dept: EMERGENCY MEDICINE | Facility: HOSPITAL | Age: 65
End: 2025-02-15
Payer: MEDICARE

## 2025-02-15 ENCOUNTER — APPOINTMENT (OUTPATIENT)
Dept: RADIOLOGY | Facility: HOSPITAL | Age: 65
End: 2025-02-15
Payer: MEDICARE

## 2025-02-15 ENCOUNTER — HOSPITAL ENCOUNTER (EMERGENCY)
Facility: HOSPITAL | Age: 65
Discharge: HOME | End: 2025-02-15
Attending: EMERGENCY MEDICINE
Payer: MEDICARE

## 2025-02-15 VITALS
RESPIRATION RATE: 10 BRPM | SYSTOLIC BLOOD PRESSURE: 169 MMHG | TEMPERATURE: 98.3 F | HEART RATE: 59 BPM | OXYGEN SATURATION: 99 % | DIASTOLIC BLOOD PRESSURE: 91 MMHG

## 2025-02-15 DIAGNOSIS — R07.9 CHEST PAIN, UNSPECIFIED TYPE: Primary | ICD-10-CM

## 2025-02-15 DIAGNOSIS — R55 NEAR SYNCOPE: ICD-10-CM

## 2025-02-15 LAB
ALBUMIN SERPL BCP-MCNC: 4.3 G/DL (ref 3.4–5)
ALP SERPL-CCNC: 77 U/L (ref 33–136)
ALT SERPL W P-5'-P-CCNC: 21 U/L (ref 7–45)
ANION GAP SERPL CALC-SCNC: 12 MMOL/L (ref 10–20)
AST SERPL W P-5'-P-CCNC: 22 U/L (ref 9–39)
ATRIAL RATE: 61 BPM
BASOPHILS # BLD AUTO: 0.04 X10*3/UL (ref 0–0.1)
BASOPHILS NFR BLD AUTO: 1.2 %
BILIRUB SERPL-MCNC: 0.7 MG/DL (ref 0–1.2)
BUN SERPL-MCNC: 10 MG/DL (ref 6–23)
CALCIUM SERPL-MCNC: 10.6 MG/DL (ref 8.6–10.6)
CARDIAC TROPONIN I PNL SERPL HS: 3 NG/L (ref 0–34)
CARDIAC TROPONIN I PNL SERPL HS: 4 NG/L (ref 0–34)
CHLORIDE SERPL-SCNC: 102 MMOL/L (ref 98–107)
CO2 SERPL-SCNC: 28 MMOL/L (ref 21–32)
CREAT SERPL-MCNC: 0.57 MG/DL (ref 0.5–1.05)
EGFRCR SERPLBLD CKD-EPI 2021: >90 ML/MIN/1.73M*2
EOSINOPHIL # BLD AUTO: 0.15 X10*3/UL (ref 0–0.7)
EOSINOPHIL NFR BLD AUTO: 4.5 %
ERYTHROCYTE [DISTWIDTH] IN BLOOD BY AUTOMATED COUNT: 13.4 % (ref 11.5–14.5)
GLUCOSE SERPL-MCNC: 83 MG/DL (ref 74–99)
HCT VFR BLD AUTO: 38.2 % (ref 36–46)
HGB BLD-MCNC: 13.2 G/DL (ref 12–16)
IMM GRANULOCYTES # BLD AUTO: 0.01 X10*3/UL (ref 0–0.7)
IMM GRANULOCYTES NFR BLD AUTO: 0.3 % (ref 0–0.9)
LYMPHOCYTES # BLD AUTO: 1.23 X10*3/UL (ref 1.2–4.8)
LYMPHOCYTES NFR BLD AUTO: 37.2 %
MAGNESIUM SERPL-MCNC: 1.81 MG/DL (ref 1.6–2.4)
MCH RBC QN AUTO: 29.3 PG (ref 26–34)
MCHC RBC AUTO-ENTMCNC: 34.6 G/DL (ref 32–36)
MCV RBC AUTO: 85 FL (ref 80–100)
MONOCYTES # BLD AUTO: 0.25 X10*3/UL (ref 0.1–1)
MONOCYTES NFR BLD AUTO: 7.6 %
NEUTROPHILS # BLD AUTO: 1.63 X10*3/UL (ref 1.2–7.7)
NEUTROPHILS NFR BLD AUTO: 49.2 %
NRBC BLD-RTO: 0 /100 WBCS (ref 0–0)
P AXIS: 47 DEGREES
P OFFSET: 198 MS
P ONSET: 142 MS
PLATELET # BLD AUTO: 229 X10*3/UL (ref 150–450)
POTASSIUM SERPL-SCNC: 3.3 MMOL/L (ref 3.5–5.3)
PR INTERVAL: 170 MS
PROT SERPL-MCNC: 7.8 G/DL (ref 6.4–8.2)
Q ONSET: 227 MS
QRS COUNT: 10 BEATS
QRS DURATION: 72 MS
QT INTERVAL: 406 MS
QTC CALCULATION(BAZETT): 408 MS
QTC FREDERICIA: 408 MS
R AXIS: 52 DEGREES
RBC # BLD AUTO: 4.5 X10*6/UL (ref 4–5.2)
SODIUM SERPL-SCNC: 139 MMOL/L (ref 136–145)
T AXIS: 28 DEGREES
T OFFSET: 430 MS
VENTRICULAR RATE: 61 BPM
WBC # BLD AUTO: 3.3 X10*3/UL (ref 4.4–11.3)

## 2025-02-15 PROCEDURE — 71045 X-RAY EXAM CHEST 1 VIEW: CPT

## 2025-02-15 PROCEDURE — 36415 COLL VENOUS BLD VENIPUNCTURE: CPT

## 2025-02-15 PROCEDURE — 2500000001 HC RX 250 WO HCPCS SELF ADMINISTERED DRUGS (ALT 637 FOR MEDICARE OP)

## 2025-02-15 PROCEDURE — 93005 ELECTROCARDIOGRAM TRACING: CPT

## 2025-02-15 PROCEDURE — 99285 EMERGENCY DEPT VISIT HI MDM: CPT | Mod: 25 | Performed by: EMERGENCY MEDICINE

## 2025-02-15 PROCEDURE — 84484 ASSAY OF TROPONIN QUANT: CPT

## 2025-02-15 PROCEDURE — 80053 COMPREHEN METABOLIC PANEL: CPT

## 2025-02-15 PROCEDURE — 99285 EMERGENCY DEPT VISIT HI MDM: CPT | Performed by: EMERGENCY MEDICINE

## 2025-02-15 PROCEDURE — 71045 X-RAY EXAM CHEST 1 VIEW: CPT | Mod: FOREIGN READ | Performed by: RADIOLOGY

## 2025-02-15 PROCEDURE — 93010 ELECTROCARDIOGRAM REPORT: CPT | Performed by: EMERGENCY MEDICINE

## 2025-02-15 PROCEDURE — 85025 COMPLETE CBC W/AUTO DIFF WBC: CPT

## 2025-02-15 PROCEDURE — 83735 ASSAY OF MAGNESIUM: CPT

## 2025-02-15 RX ORDER — POTASSIUM CHLORIDE 1.5 G/1.58G
20 POWDER, FOR SOLUTION ORAL ONCE
Status: COMPLETED | OUTPATIENT
Start: 2025-02-15 | End: 2025-02-15

## 2025-02-15 RX ADMIN — POTASSIUM CHLORIDE 20 MEQ: 1.5 POWDER, FOR SOLUTION ORAL at 16:47

## 2025-02-15 ASSESSMENT — PAIN SCALES - GENERAL
PAINLEVEL_OUTOF10: 0 - NO PAIN
PAINLEVEL_OUTOF10: 3
PAINLEVEL_OUTOF10: 3

## 2025-02-15 ASSESSMENT — COLUMBIA-SUICIDE SEVERITY RATING SCALE - C-SSRS
2. HAVE YOU ACTUALLY HAD ANY THOUGHTS OF KILLING YOURSELF?: NO
6. HAVE YOU EVER DONE ANYTHING, STARTED TO DO ANYTHING, OR PREPARED TO DO ANYTHING TO END YOUR LIFE?: NO
1. IN THE PAST MONTH, HAVE YOU WISHED YOU WERE DEAD OR WISHED YOU COULD GO TO SLEEP AND NOT WAKE UP?: NO

## 2025-02-15 ASSESSMENT — LIFESTYLE VARIABLES
EVER FELT BAD OR GUILTY ABOUT YOUR DRINKING: NO
EVER HAD A DRINK FIRST THING IN THE MORNING TO STEADY YOUR NERVES TO GET RID OF A HANGOVER: NO
HAVE YOU EVER FELT YOU SHOULD CUT DOWN ON YOUR DRINKING: NO
HAVE PEOPLE ANNOYED YOU BY CRITICIZING YOUR DRINKING: NO
TOTAL SCORE: 0

## 2025-02-15 ASSESSMENT — PAIN DESCRIPTION - DESCRIPTORS: DESCRIPTORS: ACHING

## 2025-02-15 ASSESSMENT — PAIN - FUNCTIONAL ASSESSMENT: PAIN_FUNCTIONAL_ASSESSMENT: 0-10

## 2025-02-15 ASSESSMENT — PAIN DESCRIPTION - LOCATION: LOCATION: HEAD

## 2025-02-15 ASSESSMENT — PAIN DESCRIPTION - PAIN TYPE: TYPE: ACUTE PAIN

## 2025-02-15 NOTE — ED TRIAGE NOTES
Patient brought in by EMS for acute dizziness and chest pain that began 3 hrs PTA. Patient has no cardiac hx; has hypertension and is compliant with her at home medications. Patient reports mild headache upon arrival, but states no chest pain at this time. EKG obtained and MD at bedside.

## 2025-02-15 NOTE — DISCHARGE INSTRUCTIONS
Please follow-up with your primary care provider in regards to this ED visit.  She will need to have recheck your blood pressure at that time the most likely need to adjust your medications for your elevated blood pressures.  Please keep a log at home to help with their decision making process.  If you have any returning chest pain shortness of breath lightheadedness please return to the ED for reevaluation.

## 2025-02-15 NOTE — ED PROVIDER NOTES
EMERGENCY DEPARTMENT ENCOUNTER      Pt Name: Louann Vazquez  MRN: [id number]  Birthdate 1960  Date of evaluation: 2/15/2025    HISTORY OF PRESENT ILLNESS    Louann Vazquez is an 65 y.o. female with history including hypertension, hyperlipidemia, meningioma, GERD, SNHL presenting to the emergency department for dizziness.  Patient had an episode of lightheadedness earlier today.  It was approximately 3 hours prior to arrival.  Unknown how long it lasted for.  She describes it as feeling as if she was in a pass out which she denies any vertigo-like symptoms of room spinning vision changes.  Patient states after that she started to feel lightheaded she then noticed feeling very warm and thought she broke out into a sweat.  Patient states that she also developed some chest tightness and pressure.  Denies any recent fevers, chills, nausea, vomiting, diarrhea, shortness of breath.  Patient symptoms have resolved by the time she arrived.  She did have flu like symptoms 3 weeks ago.  She denies any cardiac history.    Former smoker  Father had a MI in his 70s.      PAST MEDICAL HISTORY     Past Medical History:   Diagnosis Date    Acute cystitis with hematuria 07/31/2016    Acute cystitis with hematuria    Glaucoma     Nonspecific reaction to tuberculin skin test without active tuberculosis 06/15/2014    History of positive PPD, untreated    Other abnormal glucose 05/21/2018    Other abnormal glucose    Personal history of diseases of the blood and blood-forming organs and certain disorders involving the immune mechanism 05/21/2018    History of iron deficiency anemia    Personal history of diseases of the skin and subcutaneous tissue 10/27/2017    History of abscess of skin and subcutaneous tissue    Personal history of other diseases of the respiratory system     History of hay fever    Personal history of other diseases of the respiratory system 05/24/2018    History of acute bronchitis    Personal history of  other infectious and parasitic diseases 05/24/2018    History of viral infection    Preglaucoma, unspecified, bilateral 04/08/2021    Glaucoma suspect, both eyes    Presence of intraocular lens 06/21/2018    Pseudophakia of right eye    Presence of intraocular lens 06/21/2018    Pseudophakia of left eye       SURGICAL HISTORY       Past Surgical History:   Procedure Laterality Date    CATARACT EXTRACTION Bilateral 06/30/2015    Cataract Surgery    CHOLECYSTECTOMY  06/30/2015    Cholecystectomy       CURRENT MEDICATIONS       Previous Medications    ATORVASTATIN (LIPITOR) 10 MG TABLET    Take 1 tablet (10 mg) by mouth once daily.    CHOLECALCIFEROL (VITAMIN D-3) 25 MCG (1000 UT) CAPSULE    Take 1 capsule (25 mcg) by mouth once daily.    CHOLECALCIFEROL, VITAMIN D3, 75 MCG (3,000 UNIT) TABLET    Take 1 tablet (75 mcg) by mouth once daily.    FEXOFENADINE (ALLEGRA) 180 MG TABLET    Take 1 tablet (180 mg) by mouth once daily.    HYDROCHLOROTHIAZIDE (MICROZIDE) 12.5 MG TABLET    Take 1 tablet (12.5 mg) by mouth once daily.    KETOTIFEN (ZADITOR) 0.025 % (0.035 %) OPHTHALMIC SOLUTION    Administer into affected eye(s) every 12 hours.    TIMOLOL (TIMOPTIC) 0.5 % OPHTHALMIC SOLUTION    ADMINISTER 1 DROP INTO BOTH EYES ONCE DAILY.       ALLERGIES     Patient has no known allergies.    FAMILY HISTORY       Family History   Problem Relation Name Age of Onset    Hypertension Mother      Tuberculosis Mother      Coronary artery disease Father  55    Colon cancer Brother Ronak Amador     Hypertension Sibling vance Amador     Ovarian cancer Sibling vance Amador     Glaucoma Neg Hx      Macular degeneration Neg Hx          SOCIAL HISTORY       Social History     Socioeconomic History    Marital status: Single   Tobacco Use    Smoking status: Former     Types: Cigarettes    Smokeless tobacco: Never   Substance and Sexual Activity    Alcohol use: Yes    Drug use: Never     Social Drivers of Health     Food Insecurity: No Food  Insecurity (6/14/2024)    Hunger Vital Sign     Worried About Running Out of Food in the Last Year: Never true     Ran Out of Food in the Last Year: Never true       PHYSICAL EXAM       ED Triage Vitals   Temp Pulse Resp BP   -- -- -- --      SpO2 Temp src Heart Rate Source Patient Position   -- -- -- --      BP Location FiO2 (%)     -- --       Physical Exam  Vitals and nursing note reviewed.   Constitutional:       General: She is not in acute distress.     Appearance: She is well-developed. She is obese.   HENT:      Head: Normocephalic and atraumatic.   Eyes:      Conjunctiva/sclera: Conjunctivae normal.   Cardiovascular:      Rate and Rhythm: Normal rate and regular rhythm.      Pulses: Normal pulses.      Heart sounds: No murmur heard.  Pulmonary:      Effort: Pulmonary effort is normal. No respiratory distress.      Breath sounds: Normal breath sounds.   Abdominal:      Palpations: Abdomen is soft.      Tenderness: There is no abdominal tenderness.   Musculoskeletal:         General: No swelling.   Skin:     General: Skin is warm and dry.      Capillary Refill: Capillary refill takes less than 2 seconds.   Neurological:      General: No focal deficit present.      Mental Status: She is alert and oriented to person, place, and time.   Psychiatric:         Mood and Affect: Mood normal.          DIAGNOSTIC RESULTS     LABS:  Labs Reviewed   CBC WITH AUTO DIFFERENTIAL - Abnormal       Result Value    WBC 3.3 (*)     nRBC 0.0      RBC 4.50      Hemoglobin 13.2      Hematocrit 38.2      MCV 85      MCH 29.3      MCHC 34.6      RDW 13.4      Platelets 229      Neutrophils % 49.2      Immature Granulocytes %, Automated 0.3      Lymphocytes % 37.2      Monocytes % 7.6      Eosinophils % 4.5      Basophils % 1.2      Neutrophils Absolute 1.63      Immature Granulocytes Absolute, Automated 0.01      Lymphocytes Absolute 1.23      Monocytes Absolute 0.25      Eosinophils Absolute 0.15      Basophils Absolute 0.04      COMPREHENSIVE METABOLIC PANEL - Abnormal    Glucose 83      Sodium 139      Potassium 3.3 (*)     Chloride 102      Bicarbonate 28      Anion Gap 12      Urea Nitrogen 10      Creatinine 0.57      eGFR >90      Calcium 10.6      Albumin 4.3      Alkaline Phosphatase 77      Total Protein 7.8      AST 22      Bilirubin, Total 0.7      ALT 21     MAGNESIUM - Normal    Magnesium 1.81     SERIAL TROPONIN-INITIAL - Normal    Troponin I, High Sensitivity (CMC) 3      Narrative:     Less than 99th percentile of normal range cutoff-  Female and children under 18 years old <35 ng/L; Male <54 ng/L: Negative  Repeat testing should be performed if clinically indicated.     Female and children under 18 years old  ng/L; Male  ng/L:  Consistent with possible cardiac damage and possible increased clinical   risk. Serial measurements may help to assess extent of myocardial damage.     >120 ng/L: Consistent with cardiac damage, increased clinical risk and  myocardial infarction. Serial measurements may help assess extent of   myocardial damage.      NOTE: Children less than 1 year old may have higher baseline troponin   levels and results should be interpreted in conjunction with the overall   clinical context.    NOTE: Troponin I testing is performed using a different   testing methodology at [facility] ([facility]) than at other   [facility]. Direct result comparisons should only   be made within the same method.     SERIAL TROPONIN, 1 HOUR - Normal    Troponin I, High Sensitivity (CMC) 4      Narrative:     Less than 99th percentile of normal range cutoff-  Female and children under 18 years old <35 ng/L; Male <54 ng/L: Negative  Repeat testing should be performed if clinically indicated.     Female and children under 18 years old  ng/L; Male  ng/L:  Consistent with possible cardiac damage and possible increased clinical   risk. Serial measurements may help to assess extent of myocardial damage.      >120 ng/L: Consistent with cardiac damage, increased clinical risk and  myocardial infarction. Serial measurements may help assess extent of   myocardial damage.      NOTE: Children less than 1 year old may have higher baseline troponin   levels and results should be interpreted in conjunction with the overall   clinical context.    NOTE: Troponin I testing is performed using a different   testing methodology at [facility] than at other   [facility]. Direct result comparisons should only   be made within the same method.     TROPONIN SERIES- (INITIAL, 1 HR)    Narrative:     The following orders were created for panel order Troponin I Series, High Sensitivity (0, 1 HR).  Procedure                               Abnormality         Status                     ---------                               -----------         ------                     Troponin I, High Sensiti...[[id number]]  Normal              Final result               Troponin, High Sensitivi...[[id number]]  Normal              Final result                 Please view results for these tests on the individual orders.       All other labs were within normal range or not returned as of this dictation.    Imaging  XR chest 1 view   Final Result   Top normal heart size with no radiographic signs of active pulmonary   parenchymal infiltration.   Signed by Ivana Andrew DO           Procedures  Procedures     EMERGENCY DEPARTMENT COURSE/MDM:   Medical Decision Making  Louann Vazquez is an 65 y.o. female with history including hypertension, hyperlipidemia, meningioma, GERD, SNHL presenting to the emergency department for dizziness.  Patient's dizziness is more consistent with near syncope episode.  Vitals are normal on arrival.  Mildly hypertensive but patient did take her medications today.  Patient is reporting a 1 out of 10 headache after the episode.  No neurological deficits.  Given the chest tightness after developing near syncope  cardiac workup ordered.  Heart score right now 3.        ED Course as of 02/15/25 1657   Sat Feb 15, 2025   1434 EKG 1429: Sinus 61 bpm, CO interval 170 normal, QTc 408 normal, no ST elevations or depressions, flattening of T wave in 3 no signs of acute cardiac ischemia [SK]   1524 EKG 1521: Sinus 62 bpm,  normal, QTc 428 normal flattening of the T wave in aVF T wave inversion in 3 no signs of acute cardiac ischemia [SK]   1652 Lab results reviewed.  Patient is troponin negative x 2.  Magnesium normal.  Does have slight hypo-K at 3.3 given potassium 20 mill equivalents here.  Patient's blood pressure still elevated but most likely needs adjustment to her home medications.  Recommend outpatient follow-up with her PCP.  Patient has a heart score of 3.  Recommend follow-up with PCP and cardiologist in regards to this ED visit. [SK]      ED Course User Index  [SK] Vaishali Pham DO        External records reviewed: recent inpatient, clinic, and prior ED notes  Labs and Diagnostic imaging independently reviewed/interpreted by me.    Patient plan, care, lab results and imaging were all discussed with attending.    ED Medications administered this visit:    Medications   potassium chloride (Klor-Con) packet 20 mEq (20 mEq oral Given 2/15/25 1647)     New Prescriptions from this visit:    New Prescriptions    No medications on file       (Please note that portions of this note were completed with a voice recognition program.  Efforts were made to edit the dictations but occasionally words are mis-transcribed.)     Vaishali Pham DO  Resident  02/15/25 1653       Vaishali Pham DO  Resident  02/15/25 1657

## 2025-02-21 ENCOUNTER — OFFICE VISIT (OUTPATIENT)
Dept: PRIMARY CARE | Facility: CLINIC | Age: 65
End: 2025-02-21
Payer: COMMERCIAL

## 2025-02-21 VITALS
TEMPERATURE: 96.9 F | BODY MASS INDEX: 32.88 KG/M2 | DIASTOLIC BLOOD PRESSURE: 83 MMHG | HEART RATE: 78 BPM | WEIGHT: 222 LBS | RESPIRATION RATE: 16 BRPM | SYSTOLIC BLOOD PRESSURE: 139 MMHG | HEIGHT: 69 IN | OXYGEN SATURATION: 100 %

## 2025-02-21 DIAGNOSIS — I10 PRIMARY HYPERTENSION: Primary | ICD-10-CM

## 2025-02-21 DIAGNOSIS — Z09 HOSPITAL DISCHARGE FOLLOW-UP: ICD-10-CM

## 2025-02-21 PROCEDURE — 3008F BODY MASS INDEX DOCD: CPT | Performed by: FAMILY MEDICINE

## 2025-02-21 PROCEDURE — 1036F TOBACCO NON-USER: CPT | Performed by: FAMILY MEDICINE

## 2025-02-21 PROCEDURE — 99214 OFFICE O/P EST MOD 30 MIN: CPT | Performed by: FAMILY MEDICINE

## 2025-02-21 PROCEDURE — 1126F AMNT PAIN NOTED NONE PRSNT: CPT | Performed by: FAMILY MEDICINE

## 2025-02-21 PROCEDURE — 3079F DIAST BP 80-89 MM HG: CPT | Performed by: FAMILY MEDICINE

## 2025-02-21 PROCEDURE — 3075F SYST BP GE 130 - 139MM HG: CPT | Performed by: FAMILY MEDICINE

## 2025-02-21 RX ORDER — HYDROCHLOROTHIAZIDE 25 MG/1
25 TABLET ORAL DAILY
Qty: 30 TABLET | Refills: 5 | Status: SHIPPED | OUTPATIENT
Start: 2025-02-21 | End: 2025-08-20

## 2025-02-21 SDOH — ECONOMIC STABILITY: FOOD INSECURITY: WITHIN THE PAST 12 MONTHS, YOU WORRIED THAT YOUR FOOD WOULD RUN OUT BEFORE YOU GOT MONEY TO BUY MORE.: NEVER TRUE

## 2025-02-21 SDOH — ECONOMIC STABILITY: FOOD INSECURITY: WITHIN THE PAST 12 MONTHS, THE FOOD YOU BOUGHT JUST DIDN'T LAST AND YOU DIDN'T HAVE MONEY TO GET MORE.: NEVER TRUE

## 2025-02-21 ASSESSMENT — PATIENT HEALTH QUESTIONNAIRE - PHQ9
1. LITTLE INTEREST OR PLEASURE IN DOING THINGS: NOT AT ALL
2. FEELING DOWN, DEPRESSED OR HOPELESS: NOT AT ALL
SUM OF ALL RESPONSES TO PHQ9 QUESTIONS 1 AND 2: 0

## 2025-02-21 ASSESSMENT — LIFESTYLE VARIABLES: HOW OFTEN DO YOU HAVE SIX OR MORE DRINKS ON ONE OCCASION: LESS THAN MONTHLY

## 2025-02-21 ASSESSMENT — ENCOUNTER SYMPTOMS
OCCASIONAL FEELINGS OF UNSTEADINESS: 0
LOSS OF SENSATION IN FEET: 0
DEPRESSION: 0

## 2025-02-21 ASSESSMENT — PAIN SCALES - GENERAL: PAINLEVEL_OUTOF10: 0-NO PAIN

## 2025-02-21 NOTE — PROGRESS NOTES
"Subjective   Patient ID: Louann Vazquez is a 65 y.o. female who presents for hospital follow-up    HPI     #ED visit  - On 2/15 presented to the Ed with lightheadedness and chest tightness  - EKG and troponin were reassuring  - Her BP was elevated to 169/91 at this time  - Labs showed mild hypokalemia- she was given potassium  - No further episodes of chest pain or dizziness like she had in the ER. However does notice a little chest pressure with stress, of which she has had a lot recently   - Since this time she has been checking BP daily. She is getting the following numbers: 180-200/100-110. However she was told that her cuff isn't accurate.       # HTN  - Taking hydrochlorothiazide 12.5 mg. Previously took 25 mg daily, but this was decreased in 2020.   - Denies any side effects.   - Today we checked her home cuff- it reads 163/97 (compared to our 139/83). Cuff appears to fit well and she checks BP with accurate technique.   - She additionally brought a new cuff, which she has not used yet. This one shows a BP of 150/94 (compared to 139/83). However it was a medium cuff and she needs a large cuff.   - Last documented blood pressures as follows:  October 2023: 121/76  June 2024:  120/78  November 2024: 122/73  December 2024: 159/73  ER visit 2/15/25: 169/91      Objective   /83   Pulse 78   Temp 36.1 °C (96.9 °F) (Temporal)   Resp 16   Ht 1.753 m (5' 9\")   Wt 101 kg (222 lb)   SpO2 100%   BMI 32.78 kg/m²     Physical Exam    General: well appearing, no distress  CV: Regular rate and rhythm, no murmur  Lungs: Clear to auscultation bilaterally  Abdomen: Soft, nontender, nondistended  Extremities: No edema noted  Psych: Appropriate mood and affect      Assessment/Plan   64 yo here for ER follow-up    # HTN  - Suspect worsening control secondary to increased life stressors and subsequent change in diet and exercise routine. We discussed options. She would like to temporarily increase dose of " hydrochlorothiazide to 25 mg. This is reasonable given multiple elevated readings recently, although we do run the risk of hypokalemia. Plan to check BMP today, increase hydrochlorothiazide, obtain well fitting BP cuff, and return in 3 weeks for BP check and repeat BMP.

## 2025-02-22 LAB
ANION GAP SERPL CALCULATED.4IONS-SCNC: 12 MMOL/L (CALC) (ref 7–17)
BUN SERPL-MCNC: 10 MG/DL (ref 7–25)
BUN/CREAT SERPL: ABNORMAL (CALC) (ref 6–22)
CALCIUM SERPL-MCNC: 10.6 MG/DL (ref 8.6–10.4)
CHLORIDE SERPL-SCNC: 102 MMOL/L (ref 98–110)
CO2 SERPL-SCNC: 25 MMOL/L (ref 20–32)
CREAT SERPL-MCNC: 0.67 MG/DL (ref 0.5–1.05)
EGFRCR SERPLBLD CKD-EPI 2021: 96 ML/MIN/1.73M2
GLUCOSE SERPL-MCNC: 84 MG/DL (ref 65–99)
POTASSIUM SERPL-SCNC: 4 MMOL/L (ref 3.5–5.3)
SODIUM SERPL-SCNC: 139 MMOL/L (ref 135–146)

## 2025-03-13 ENCOUNTER — APPOINTMENT (OUTPATIENT)
Dept: OPHTHALMOLOGY | Facility: CLINIC | Age: 65
End: 2025-03-13
Payer: COMMERCIAL

## 2025-03-27 ENCOUNTER — APPOINTMENT (OUTPATIENT)
Dept: OPHTHALMOLOGY | Facility: CLINIC | Age: 65
End: 2025-03-27
Payer: COMMERCIAL

## 2025-06-16 ENCOUNTER — OFFICE VISIT (OUTPATIENT)
Dept: PRIMARY CARE | Facility: CLINIC | Age: 65
End: 2025-06-16
Payer: COMMERCIAL

## 2025-06-16 VITALS
SYSTOLIC BLOOD PRESSURE: 135 MMHG | BODY MASS INDEX: 32.88 KG/M2 | HEART RATE: 77 BPM | OXYGEN SATURATION: 99 % | TEMPERATURE: 97.3 F | RESPIRATION RATE: 16 BRPM | WEIGHT: 222 LBS | DIASTOLIC BLOOD PRESSURE: 85 MMHG | HEIGHT: 69 IN

## 2025-06-16 DIAGNOSIS — I10 PRIMARY HYPERTENSION: Primary | ICD-10-CM

## 2025-06-16 PROCEDURE — 1036F TOBACCO NON-USER: CPT | Performed by: FAMILY MEDICINE

## 2025-06-16 PROCEDURE — 3079F DIAST BP 80-89 MM HG: CPT | Performed by: FAMILY MEDICINE

## 2025-06-16 PROCEDURE — 99213 OFFICE O/P EST LOW 20 MIN: CPT | Performed by: FAMILY MEDICINE

## 2025-06-16 PROCEDURE — 3008F BODY MASS INDEX DOCD: CPT | Performed by: FAMILY MEDICINE

## 2025-06-16 PROCEDURE — 3075F SYST BP GE 130 - 139MM HG: CPT | Performed by: FAMILY MEDICINE

## 2025-06-16 PROCEDURE — 1126F AMNT PAIN NOTED NONE PRSNT: CPT | Performed by: FAMILY MEDICINE

## 2025-06-16 RX ORDER — HYDROCHLOROTHIAZIDE 25 MG/1
25 TABLET ORAL DAILY
Qty: 90 TABLET | Refills: 1 | Status: SHIPPED | OUTPATIENT
Start: 2025-06-16 | End: 2025-12-13

## 2025-06-16 ASSESSMENT — ENCOUNTER SYMPTOMS
DEPRESSION: 0
LOSS OF SENSATION IN FEET: 0
OCCASIONAL FEELINGS OF UNSTEADINESS: 0

## 2025-06-16 ASSESSMENT — PAIN SCALES - GENERAL: PAINLEVEL_OUTOF10: 0-NO PAIN

## 2025-06-16 ASSESSMENT — PATIENT HEALTH QUESTIONNAIRE - PHQ9
1. LITTLE INTEREST OR PLEASURE IN DOING THINGS: NOT AT ALL
SUM OF ALL RESPONSES TO PHQ9 QUESTIONS 1 AND 2: 0
2. FEELING DOWN, DEPRESSED OR HOPELESS: NOT AT ALL

## 2025-06-16 NOTE — PROGRESS NOTES
"Subjective   Patient ID: Louann Vazquez is a 65 y.o. female who presents for follow-up    HPI     # HTN  - Taking hydrochlorothiazide 25 mg. Increased last visit   - No headaches, chest pain, shortness of breath, vision changes, dizziness  - She got a new BP cuff, hasn't started checking regularly at home   - No issues with sleep, no concern about WIN  - Follows with endocrinology for hyperparathyroidism      Objective   /85   Pulse 77   Temp 36.3 °C (97.3 °F)   Resp 16   Ht 1.753 m (5' 9\")   Wt 101 kg (222 lb)   SpO2 99%   BMI 32.78 kg/m²     Physical Exam    General: well appearing, no distress  CV: Regular rate and rhythm, no murmur  Lungs: Clear to auscultation bilaterally  Abdomen: Soft, nontender, nondistended  Extremities: No edema noted  Psych: Appropriate mood and affect      Assessment/Plan   64 yo here for follow-up    # HTN  - Continue hydrochlorothiazide  - Check BMP  - Check BP at home    #Hyperparathyroid  - Follows with endocrinology     RTC 4-6 months or sooner as needed  "

## 2025-06-17 LAB
ANION GAP SERPL CALCULATED.4IONS-SCNC: 9 MMOL/L (CALC) (ref 7–17)
BUN SERPL-MCNC: 11 MG/DL (ref 7–25)
BUN/CREAT SERPL: ABNORMAL (CALC) (ref 6–22)
CALCIUM SERPL-MCNC: 10.8 MG/DL (ref 8.6–10.4)
CHLORIDE SERPL-SCNC: 101 MMOL/L (ref 98–110)
CO2 SERPL-SCNC: 29 MMOL/L (ref 20–32)
CREAT SERPL-MCNC: 0.63 MG/DL (ref 0.5–1.05)
EGFRCR SERPLBLD CKD-EPI 2021: 98 ML/MIN/1.73M2
GLUCOSE SERPL-MCNC: 82 MG/DL (ref 65–99)
POTASSIUM SERPL-SCNC: 3.9 MMOL/L (ref 3.5–5.3)
SODIUM SERPL-SCNC: 139 MMOL/L (ref 135–146)

## 2025-06-20 ENCOUNTER — APPOINTMENT (OUTPATIENT)
Dept: PRIMARY CARE | Facility: CLINIC | Age: 65
End: 2025-06-20
Payer: COMMERCIAL

## 2025-11-07 ENCOUNTER — APPOINTMENT (OUTPATIENT)
Dept: ENDOCRINOLOGY | Facility: CLINIC | Age: 65
End: 2025-11-07
Payer: COMMERCIAL